# Patient Record
Sex: FEMALE | Race: WHITE | NOT HISPANIC OR LATINO
[De-identification: names, ages, dates, MRNs, and addresses within clinical notes are randomized per-mention and may not be internally consistent; named-entity substitution may affect disease eponyms.]

---

## 2022-09-13 ENCOUNTER — APPOINTMENT (OUTPATIENT)
Dept: ORTHOPEDIC SURGERY | Facility: CLINIC | Age: 82
End: 2022-09-13

## 2022-09-13 VITALS — BODY MASS INDEX: 23.22 KG/M2 | HEIGHT: 61 IN | WEIGHT: 123 LBS

## 2022-09-13 PROCEDURE — 99202 OFFICE O/P NEW SF 15 MIN: CPT | Mod: 25

## 2022-09-13 PROCEDURE — 20526 THER INJECTION CARP TUNNEL: CPT | Mod: RT

## 2022-09-13 NOTE — PROCEDURE
[Carpal Tunnel] : carpal tunnel [Right] : of the right [Alcohol] : alcohol [___ cc    1%] : Lidocaine ~Vcc of 1%  [___ cc    40mg] : Triamcinolone (Kenalog) ~Vcc of 40 mg  [de-identified] : carpal tunnel

## 2022-09-13 NOTE — IMAGING
[de-identified] : Right Hand\par No erythema, swelling or wounds\par Normal muscle tone/ bulk\par Full painless finger ROM\par + AIN/ PIN/ UIlnar n\par Sensation diminished in median distribution, normal in radial/ulnar n.\par fingers wwp\par + Phalen\par - Tinel at wrist

## 2022-09-13 NOTE — DISCUSSION/SUMMARY
[de-identified] : I had a long conversation with the patient today reviewing the etiology, signs, symptoms and potential treatment options for carpal tunnel syndrome. We discussed the risks, benefits and alternatives to bracing, steroid injections and ultimately surgical intervention. With all of this in mind the patient would like to proceed with a steroid injection today which I feel is reasonable. We will have her return in 2 weeks time for repeat evaluation. The patient had the opportunity to ask questions, all of which were answered her satisfaction. She is in agreement with this plan.\par

## 2022-10-06 ENCOUNTER — APPOINTMENT (OUTPATIENT)
Dept: ORTHOPEDIC SURGERY | Facility: CLINIC | Age: 82
End: 2022-10-06

## 2022-10-06 VITALS — HEIGHT: 61 IN | WEIGHT: 123 LBS | BODY MASS INDEX: 23.22 KG/M2

## 2022-10-06 PROCEDURE — 99213 OFFICE O/P EST LOW 20 MIN: CPT

## 2022-10-06 NOTE — DISCUSSION/SUMMARY
[de-identified] : I had a long conversation with the patient today regarding the nature of this pathology as well as the expected prognosis and potential treatment options. We discussed the role for bracing, steroid injections, physical therapy and ultimately carpal tunnel release. We reviewed the risks, benefits and alternatives to these. We additionally discussed the routine postoperative course. With all of this in mind the patient would like to continue to think about her options which I agree is reasonable. She will call us in the coming days to let us know if she would like to proceed with endoscopic right carpal tunnel release.\par

## 2022-10-06 NOTE — IMAGING
[de-identified] : Right Hand\par No erythema, swelling or wounds\par Normal muscle tone/ bulk\par Full painless finger ROM\par + AIN/ PIN/ UIlnar n\par Sensation diminished in median distribution, normal in radial/ulnar n.\par fingers wwp\par + Phalen\par - Tinel at wrist \par \par Left hand\par Well healed longitudinal incision at the A1 pulley of the long finger\par No triggering is observed\par NTTP along the A1 pulley's of thumb, index, long, ring or small fingers\par Full painless finger ROM\par SILT\par wwp

## 2022-10-06 NOTE — HISTORY OF PRESENT ILLNESS
[0] : 0 [de-identified] : Georgette is an 81yo F with R carpal tunnel who returns today for repeat evaluation now 2 wks s/p steroid injection. She notes minimal improvement in her previous symptoms of numbness and tingling but does think that she has more motion and strength in her fingers. She recently noticed triggering in two of her fingers on the left hand but cannot recall which today. [FreeTextEntry1] : Right hand

## 2023-07-17 ENCOUNTER — APPOINTMENT (OUTPATIENT)
Dept: ORTHOPEDIC SURGERY | Facility: CLINIC | Age: 83
End: 2023-07-17

## 2023-07-18 ENCOUNTER — APPOINTMENT (OUTPATIENT)
Dept: ORTHOPEDIC SURGERY | Facility: CLINIC | Age: 83
End: 2023-07-18
Payer: MEDICARE

## 2023-07-18 VITALS — WEIGHT: 123 LBS | HEIGHT: 61 IN | BODY MASS INDEX: 23.22 KG/M2

## 2023-07-18 DIAGNOSIS — M54.12 RADICULOPATHY, CERVICAL REGION: ICD-10-CM

## 2023-07-18 PROCEDURE — 95864 NEEDLE EMG 4 EXTREMITIES: CPT | Mod: 1L,RT

## 2023-07-18 PROCEDURE — J3490M: CUSTOM

## 2023-07-18 PROCEDURE — 20526 THER INJECTION CARP TUNNEL: CPT | Mod: RT

## 2023-07-18 PROCEDURE — 99214 OFFICE O/P EST MOD 30 MIN: CPT | Mod: 25

## 2023-07-18 PROCEDURE — 72040 X-RAY EXAM NECK SPINE 2-3 VW: CPT

## 2023-07-18 NOTE — ASSESSMENT
[FreeTextEntry1] : EMG\par Return after EMG\par \par R Carpal tunnel injection was performed because of pain inflammation\par Anesthesia: ethyl chloride sprayed topically\par Celestone 6mg: An injection of Celestone 1cc\par Lidocaine: An injection of Lidocaine 1% 1cc\par Marcaine: An injection of Marcaine 0.5% 1cc\par \par Patient has tried OTC's including aspirin, Ibuprofen, Aleve etc or prescription NSAIDS, and/or exercises at home and/ or\par physical therapy without satisfactory response.\par After verbal consent using sterile preparation and technique. The risks, benefits, and alternatives to cortisone injection\par were explained in full to the patient. Risks outlined include but are not limited to infection, sepsis, bleeding, scarring, skin\par discoloration, temporary increase in pain, syncopal episode, failure to resolve symptoms, allergic reaction, symptom\par recurrence, and elevation of blood sugar in diabetics. Patient understood the risks. All questions were answered. After\par discussion of options, patient requested an injection. Oral informed consent was obtained and sterile prep was done of the\par injection site. Sterile technique was utilized for the procedure including the preparation of the solutions used for the\par injection. Patient tolerated the procedure well. Advised to ice the injection site this evening.\par Prep with betadine locally to site. Sterile technique used

## 2023-07-18 NOTE — PHYSICAL EXAM
[de-identified] : Neck:\par Spasm\par Tender trap, paracervical muscles\par +spurling\par \par Xrays \par \par R hand: \par Tender volar wrist \par Good finger ROM \par +Tinels \par +Phalens \par +Compression test \par Decreased sensation median nerve distribution\par +thenar atrophy

## 2023-07-18 NOTE — HISTORY OF PRESENT ILLNESS
[5] : 5 [Dull/Aching] : dull/aching [Radiating] : radiating [Shooting] : shooting [Tingling] : tingling [Intermittent] : intermittent [de-identified] : R arm pain and hand numbness\par \par For a month  [] : This patient has had an injection before: no [FreeTextEntry1] : RT hand [FreeTextEntry5] : Patient complains of pain in her Rt hand that radiates up the arm in the past 1 month, no injury, has numbness and tingling, has seen Dr. Green for this last year [de-identified] : activity [FreeTextEntry6] : numbness

## 2023-07-26 ENCOUNTER — APPOINTMENT (OUTPATIENT)
Dept: NEUROLOGY | Facility: CLINIC | Age: 83
End: 2023-07-26
Payer: MEDICARE

## 2023-07-26 DIAGNOSIS — M54.2 CERVICALGIA: ICD-10-CM

## 2023-07-26 DIAGNOSIS — R20.0 ANESTHESIA OF SKIN: ICD-10-CM

## 2023-07-26 PROCEDURE — ZZZZZ: CPT

## 2023-07-26 PROCEDURE — 95886 MUSC TEST DONE W/N TEST COMP: CPT

## 2023-07-26 PROCEDURE — 95912 NRV CNDJ TEST 11-12 STUDIES: CPT

## 2023-08-08 ENCOUNTER — APPOINTMENT (OUTPATIENT)
Dept: ORTHOPEDIC SURGERY | Facility: CLINIC | Age: 83
End: 2023-08-08
Payer: MEDICARE

## 2023-08-08 VITALS — WEIGHT: 123 LBS | HEIGHT: 61 IN | BODY MASS INDEX: 23.22 KG/M2

## 2023-08-08 DIAGNOSIS — G56.02 CARPAL TUNNEL SYNDROME, LEFT UPPER LIMB: ICD-10-CM

## 2023-08-08 PROCEDURE — 99213 OFFICE O/P EST LOW 20 MIN: CPT

## 2023-08-08 NOTE — HISTORY OF PRESENT ILLNESS
[5] : 5 [Dull/Aching] : dull/aching [Shooting] : shooting [Tingling] : tingling [de-identified] : EMG shows R mod CTS; L mild CTS  Injection for R hand helped  [FreeTextEntry1] : R hand  [FreeTextEntry6] : numbness [de-identified] : inj

## 2023-10-24 ENCOUNTER — APPOINTMENT (OUTPATIENT)
Dept: ORTHOPEDIC SURGERY | Facility: CLINIC | Age: 83
End: 2023-10-24
Payer: MEDICARE

## 2023-10-24 VITALS — WEIGHT: 123 LBS | HEIGHT: 61 IN | BODY MASS INDEX: 23.22 KG/M2

## 2023-10-24 PROCEDURE — 99214 OFFICE O/P EST MOD 30 MIN: CPT

## 2023-11-13 RX ORDER — HYDROCODONE BITARTRATE AND ACETAMINOPHEN 5; 325 MG/1; MG/1
5-325 TABLET ORAL
Qty: 10 | Refills: 0 | Status: ACTIVE | COMMUNITY
Start: 2023-11-13 | End: 1900-01-01

## 2023-11-15 ENCOUNTER — APPOINTMENT (OUTPATIENT)
Dept: ORTHOPEDIC SURGERY | Facility: AMBULATORY SURGERY CENTER | Age: 83
End: 2023-11-15

## 2023-11-21 ENCOUNTER — APPOINTMENT (OUTPATIENT)
Dept: ORTHOPEDIC SURGERY | Facility: CLINIC | Age: 83
End: 2023-11-21

## 2023-11-30 NOTE — HISTORY OF PRESENT ILLNESS
[0] : 0 [Radiating] : radiating [Tingling] : tingling [] : yes [Intermittent] : intermittent [de-identified] : Georgette is a very pleasant 83yo RHD F who presents to the office today with complaints of right finger numbness and tingling. The patient reports the insidious onset of numbness and tingling involving the right thumb, index and long fingers. She does recall bumping her upper arm roughly a month ago around the same time that these symptoms began. She denies nocturnal symptoms. [FreeTextEntry1] : right arm [FreeTextEntry5] : 9/14/22 JUNIOR SCHMIDT 82 year old F here for eval of right arm/hand. Patient's daughter stated junior hit a door with arm and she started having numbness in the finger tips.\par  [FreeTextEntry7] : right hand [FreeTextEntry9] : Tylenol  English

## 2024-01-12 RX ORDER — HYDROCODONE BITARTRATE AND ACETAMINOPHEN 5; 325 MG/1; MG/1
5-325 TABLET ORAL
Qty: 10 | Refills: 0 | Status: ACTIVE | COMMUNITY
Start: 2024-01-12 | End: 1900-01-01

## 2024-01-17 ENCOUNTER — APPOINTMENT (OUTPATIENT)
Dept: ORTHOPEDIC SURGERY | Facility: AMBULATORY SURGERY CENTER | Age: 84
End: 2024-01-17
Payer: MEDICARE

## 2024-01-17 PROCEDURE — 64721 CARPAL TUNNEL SURGERY: CPT | Mod: RT

## 2024-01-18 ENCOUNTER — APPOINTMENT (OUTPATIENT)
Dept: ORTHOPEDIC SURGERY | Facility: CLINIC | Age: 84
End: 2024-01-18

## 2024-01-23 ENCOUNTER — APPOINTMENT (OUTPATIENT)
Dept: ORTHOPEDIC SURGERY | Facility: CLINIC | Age: 84
End: 2024-01-23
Payer: MEDICARE

## 2024-01-23 VITALS — BODY MASS INDEX: 23.22 KG/M2 | HEIGHT: 61 IN | WEIGHT: 123 LBS

## 2024-01-23 DIAGNOSIS — G56.01 CARPAL TUNNEL SYNDROME, RIGHT UPPER LIMB: ICD-10-CM

## 2024-01-23 PROCEDURE — 99024 POSTOP FOLLOW-UP VISIT: CPT

## 2024-01-23 NOTE — PHYSICAL EXAM
[de-identified] : Mild hand swelling Healed incision No evidence of infection Mild tenderness at the surgical site Good finger ROM Improved sensation

## 2024-01-23 NOTE — HISTORY OF PRESENT ILLNESS
[5] : 5 [Dull/Aching] : dull/aching [] : Post Surgical Visit: yes [de-identified] : R [facility] Last week Numbness is improved [FreeTextEntry1] : R wrist  [de-identified] : R CANDIS  [de-identified] : 1/17/24

## 2024-02-15 ENCOUNTER — APPOINTMENT (OUTPATIENT)
Dept: ORTHOPEDIC SURGERY | Facility: CLINIC | Age: 84
End: 2024-02-15
Payer: MEDICARE

## 2024-02-15 PROCEDURE — 99213 OFFICE O/P EST LOW 20 MIN: CPT

## 2024-02-15 NOTE — IMAGING
[Outside films reviewed] : Outside films reviewed [Facet arthropathy] : Facet arthropathy [Disc space narrowing] : Disc space narrowing [Spondylolithesis] : Spondylolithesis [de-identified] : no lumbar paraspinal muscle tenderness no lumbar paraspinal muscle spasm   no pain with flexion no pain with extension full ROM with mild stiffness   motor exam 5/5 strength bilaterally sensory intact -SLR no dermatome of pain   ambulates without assistive device non antalgic gait able to heel/toe walk   No hip or groin pain with hip ROM    [FreeTextEntry1] : L4-5 grade 1 spondylolisthesis, significant loss of disc height L5-S1; questionable wedge deformity at T12 [de-identified] : negative

## 2024-02-15 NOTE — HISTORY OF PRESENT ILLNESS
[Lower back] : lower back [8] : 8 [Dull/Aching] : dull/aching [Localized] : localized [Constant] : constant [Sleep] : sleep [Nothing helps with pain getting better] : Nothing helps with pain getting better [de-identified] : 82 yo F presenting with low back pain x 12/2023 without injury. Pain in the low back with walking. No pain with sitting or laying down. Saw PMD, was referred for XRs and sent here. Has been on an improving trend. Tylenol and stretches. Did chiro in the past. No injections or spinal surgeries. Hx of osteoporosis but states recent bone density no longer showed osteoporosis. Better. No nocturnal awakening. Denies groin pain. Denies n/t. Denies weakness. Denies bb incontinence.  [] : no [FreeTextEntry5] : 82 yo F presenting with low back pain that started 12/2023. No radiating pain. Saw PCP; had images taken @ [facility].

## 2024-02-15 NOTE — ASSESSMENT
[FreeTextEntry1] : 82 yo F presenting with 2 months of left sided low back pain without radiculopathy. XRs from PMD show slip at L4-5 and significant loss of disc height L5-S1. There is a questionable wedge deformity at T12 - symptoms are much lower and lateral to this. 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 On an improving trend. Start PT. Continue tylenol prn. f/u in 4-6 weeks if symptoms persist will obtain MRI LS for further eval. Progress note completed by Joselyn Arceo PA-C

## 2024-03-28 ENCOUNTER — APPOINTMENT (OUTPATIENT)
Dept: ORTHOPEDIC SURGERY | Facility: CLINIC | Age: 84
End: 2024-03-28

## 2024-04-09 ENCOUNTER — APPOINTMENT (OUTPATIENT)
Dept: ORTHOPEDIC SURGERY | Facility: CLINIC | Age: 84
End: 2024-04-09

## 2024-04-09 VITALS — WEIGHT: 123 LBS | HEIGHT: 61 IN | BODY MASS INDEX: 23.22 KG/M2

## 2024-04-09 PROCEDURE — J3490M: CUSTOM

## 2024-04-09 PROCEDURE — 73030 X-RAY EXAM OF SHOULDER: CPT | Mod: LT

## 2024-04-09 PROCEDURE — 99213 OFFICE O/P EST LOW 20 MIN: CPT | Mod: 25

## 2024-04-09 PROCEDURE — 20610 DRAIN/INJ JOINT/BURSA W/O US: CPT | Mod: 1L,LT

## 2024-04-09 NOTE — HISTORY OF PRESENT ILLNESS
[de-identified] : L shoulder pain and stiffness [6] : 6 [Dull/Aching] : dull/aching [] : no [FreeTextEntry1] : L shoulder [FreeTextEntry5] : L shoulder pain since few days after getting out of shower no injury no n/t

## 2024-04-09 NOTE — PHYSICAL EXAM
[de-identified] : L shoulder: +swelling Decreased ROM in Add, abd, IR/ER, FF +impingement +drop arm  Xrays +calcification

## 2024-04-18 ENCOUNTER — APPOINTMENT (OUTPATIENT)
Dept: ORTHOPEDIC SURGERY | Facility: CLINIC | Age: 84
End: 2024-04-18
Payer: MEDICARE

## 2024-04-18 VITALS — BODY MASS INDEX: 23.22 KG/M2 | WEIGHT: 123 LBS | HEIGHT: 61 IN

## 2024-04-18 PROCEDURE — 99213 OFFICE O/P EST LOW 20 MIN: CPT | Mod: 25

## 2024-04-18 NOTE — ASSESSMENT
[FreeTextEntry1] : 82 yo F presenting with 4 months of left sided low back pain without radiculopathy. Known slip at L4-5 and significant loss of disc height L5-S1. There is a questionable wedge deformity at T12 - symptoms are much lower and lateral to this. 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 Has completed 6 weeks of PT w/o any relief.   - Gave left sided TPI today, pop well.  - Discussed if symptoms persist will call us and will obtain L Spine MRI.

## 2024-04-18 NOTE — IMAGING
[Outside films reviewed] : Outside films reviewed [Facet arthropathy] : Facet arthropathy [Disc space narrowing] : Disc space narrowing [Spondylolithesis] : Spondylolithesis [de-identified] : no lumbar paraspinal muscle tenderness no lumbar paraspinal muscle spasm   no pain with flexion no pain with extension full ROM with stiffness   motor exam 5/5 strength bilaterally sensory intact -SLR no dermatome of pain   ambulates without assistive device non antalgic gait able to heel/toe walk   No hip or groin pain with hip ROM    [FreeTextEntry1] : L4-5 grade 1 spondylolisthesis, significant loss of disc height L5-S1; questionable wedge deformity at T12 [de-identified] : negative

## 2024-04-18 NOTE — PROCEDURE
[FreeTextEntry3] : Trigger Point Injection- The risks, benefits, contents and alternatives to injection were explained in full to the patient. Risks outlined include but are not limited to infection, bleeding, scarring, skin discoloration, temporary increase in pain, syncopal episode, failure to resolve symptoms, allergic reaction, flare reaction, permanent white skin discoloration, symptom recurrence, and elevation of blood sugar in diabetics. Patient understood the risks. All questions were answered. After discussion of options, patient requested an injection. Oral informed consent was obtained, and sterile prep was done of the injection site. Sterile technique was used to introduce the mixture. The mixture consisted of:4 cc 1% lidocaine 1.5 mg of tiamsodone Patient tolerated the procedure well. Patient advised to ice the injection site this evening. Signs and symptoms of infection reviewed, and patient advised to call immediately for redness, fevers, and/or chills.

## 2024-04-18 NOTE — HISTORY OF PRESENT ILLNESS
[Lower back] : lower back [8] : 8 [Dull/Aching] : dull/aching [Localized] : localized [Constant] : constant [Sleep] : sleep [Nothing helps with pain getting better] : Nothing helps with pain getting better [de-identified] : 4/18/24: Follow Up [facility] Sp[facility]. Attended PT for over 6 weeks- hasn't helped at all. Pain radiates down left leg, especially when standing and when active. Would like TPI.    2/15/24: 82 yo F presenting with low back pain x 12/2023 without injury. Pain in the low back with walking. No pain with sitting or laying down. Saw PMD, was referred for XRs and sent here. Has been on an improving trend. Tylenol and stretches. Did chiro in the past. No injections or spinal surgeries. Hx of osteoporosis but states recent bone density no longer showed osteoporosis. Better. No nocturnal awakening. Denies groin pain. Denies n/t. Denies weakness. Denies bb incontinence.  [] : no [FreeTextEntry7] : left leg

## 2024-05-14 ENCOUNTER — APPOINTMENT (OUTPATIENT)
Dept: ORTHOPEDIC SURGERY | Facility: CLINIC | Age: 84
End: 2024-05-14
Payer: MEDICARE

## 2024-05-14 VITALS — WEIGHT: 123 LBS | HEIGHT: 61 IN | BODY MASS INDEX: 23.22 KG/M2

## 2024-05-14 DIAGNOSIS — M75.02 ADHESIVE CAPSULITIS OF LEFT SHOULDER: ICD-10-CM

## 2024-05-14 DIAGNOSIS — M75.32 CALCIFIC TENDINITIS OF LEFT SHOULDER: ICD-10-CM

## 2024-05-14 DIAGNOSIS — M75.82 OTHER SHOULDER LESIONS, LEFT SHOULDER: ICD-10-CM

## 2024-05-14 PROCEDURE — 99212 OFFICE O/P EST SF 10 MIN: CPT

## 2024-05-14 NOTE — HISTORY OF PRESENT ILLNESS
[de-identified] : L shoulder is much better from injection nad therapy  [0] : 0 [FreeTextEntry1] : L shoulder [de-identified] : inj, therapy

## 2024-07-02 ENCOUNTER — APPOINTMENT (OUTPATIENT)
Dept: ORTHOPEDIC SURGERY | Facility: CLINIC | Age: 84
End: 2024-07-02

## 2024-07-02 DIAGNOSIS — M47.816 SPONDYLOSIS W/OUT MYELOPATHY OR RADICULOPATHY, LUMBAR REGION: ICD-10-CM

## 2024-07-02 DIAGNOSIS — M48.061 SPINAL STENOSIS, LUMBAR REGION WITHOUT NEUROGENIC CLAUDICATION: ICD-10-CM

## 2024-07-02 DIAGNOSIS — M43.16 SPONDYLOLISTHESIS, LUMBAR REGION: ICD-10-CM

## 2024-08-06 ENCOUNTER — APPOINTMENT (OUTPATIENT)
Dept: ORTHOPEDIC SURGERY | Facility: CLINIC | Age: 84
End: 2024-08-06